# Patient Record
Sex: MALE | Race: WHITE | Employment: FULL TIME | URBAN - METROPOLITAN AREA
[De-identification: names, ages, dates, MRNs, and addresses within clinical notes are randomized per-mention and may not be internally consistent; named-entity substitution may affect disease eponyms.]

---

## 2019-02-08 ENCOUNTER — HOSPITAL ENCOUNTER (EMERGENCY)
Facility: CLINIC | Age: 54
Discharge: HOME | End: 2019-02-08
Attending: FAMILY MEDICINE | Admitting: FAMILY MEDICINE
Payer: COMMERCIAL

## 2019-02-08 VITALS
WEIGHT: 200 LBS | HEART RATE: 77 BPM | TEMPERATURE: 98.3 F | BODY MASS INDEX: 26.51 KG/M2 | SYSTOLIC BLOOD PRESSURE: 131 MMHG | RESPIRATION RATE: 16 BRPM | HEIGHT: 73 IN | OXYGEN SATURATION: 97 % | DIASTOLIC BLOOD PRESSURE: 75 MMHG
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
## 2019-02-08 PROCEDURE — 99282 EMERGENCY DEPT VISIT SF MDM: CPT | Performed by: FAMILY MEDICINE

## 2019-02-08 PROCEDURE — 99282 EMERGENCY DEPT VISIT SF MDM: CPT | Mod: Z6 | Performed by: FAMILY MEDICINE

## 2019-02-08 RX ORDER — METOPROLOL SUCCINATE 25 MG/1
25 TABLET, EXTENDED RELEASE ORAL DAILY
COMMUNITY
Start: 2018-11-01

## 2019-02-08 RX ORDER — MULTIVITAMIN WITH IRON
1 TABLET ORAL DAILY
COMMUNITY
Start: 2006-03-07

## 2019-02-08 RX ORDER — SIMVASTATIN 20 MG
20 TABLET ORAL EVERY EVENING
COMMUNITY
Start: 2018-08-01

## 2019-02-08 RX ORDER — ASPIRIN 81 MG/1
81 TABLET ORAL DAILY
COMMUNITY
Start: 2015-04-14

## 2019-02-08 ASSESSMENT — MIFFLIN-ST. JEOR: SCORE: 1796.07

## 2019-02-08 NOTE — ED NOTES
Pt is ready for discharge however his  wont be her until 1600ish and we have none in waiting so we are letting him wait in room for a while
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
## 2019-02-08 NOTE — DISCHARGE INSTRUCTIONS
Return to the Emergency Room if the following occurs:     Severely worsened pain, worsened breathing, vomiting, concerning changes in behavior, or for any concern at anytime.    Or, follow-up with the following provider as we discussed:     Return to your primary doctor as needed.    Medications discussed:    Ibuprofen 600 mg every six hours for pain (7 days duration).  Tylenol 1000 mg every six hours for pain (7 days duration).  Therefore, you can alternate these every three hours and do it safely.    If you received pain-relieving or sedating medication during your time in the ER, avoid alcohol, driving automobiles, or working with machinery.  Also, a responsible adult must stay with you.        Call the Nurse Advice Line at [phone] or [phone] for any concern at anytime.

## 2019-02-08 NOTE — ED AVS SNAPSHOT
[facility] Emergency Department  [address] [address]  Phone:  [phone]  Fax:  [phone]                                    Aram Perez   MRN: [id number]    Department:  [facility] Emergency Department   Date of Visit:  2/8/2019           After Visit Summary Signature Page    I have received my discharge instructions, and my questions have been answered. I have discussed any challenges I see with this plan with the nurse or doctor.    ..........................................................................................................................................  Patient/Patient Representative Signature      ..........................................................................................................................................  Patient Representative Print Name and Relationship to Patient    ..................................................               ................................................  Date                                   Time    ..........................................................................................................................................  Reviewed by Signature/Title    ...................................................              ..............................................  Date                                               Time          22EPIC Rev 08/18
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
